# Patient Record
Sex: FEMALE | Race: BLACK OR AFRICAN AMERICAN | NOT HISPANIC OR LATINO | Employment: FULL TIME | URBAN - METROPOLITAN AREA
[De-identification: names, ages, dates, MRNs, and addresses within clinical notes are randomized per-mention and may not be internally consistent; named-entity substitution may affect disease eponyms.]

---

## 2023-03-23 RX ORDER — BLOOD-GLUCOSE,RECEIVER,CONT
1 EACH MISCELLANEOUS
COMMUNITY
End: 2024-04-16

## 2023-03-23 RX ORDER — INSULIN ASPART 100 [IU]/ML
5 INJECTION, SOLUTION INTRAVENOUS; SUBCUTANEOUS
COMMUNITY
Start: 2021-06-02 | End: 2024-04-16

## 2023-03-23 RX ORDER — BLOOD-GLUCOSE SENSOR
EACH MISCELLANEOUS
COMMUNITY
End: 2024-04-16

## 2023-03-23 RX ORDER — ALBUTEROL SULFATE 90 UG/1
1-2 AEROSOL, METERED RESPIRATORY (INHALATION)
COMMUNITY
Start: 2020-04-09 | End: 2024-04-16

## 2023-03-23 RX ORDER — LOSARTAN POTASSIUM 25 MG/1
50 TABLET ORAL DAILY
COMMUNITY
Start: 2018-08-31

## 2023-03-23 RX ORDER — ALPRAZOLAM 1 MG/1
1 TABLET ORAL EVERY 6 HOURS PRN
COMMUNITY
Start: 2021-04-07 | End: 2024-04-16

## 2023-03-23 RX ORDER — INSULIN GLARGINE 100 [IU]/ML
32 INJECTION, SOLUTION SUBCUTANEOUS DAILY
COMMUNITY
Start: 2019-06-27 | End: 2024-04-16

## 2023-03-23 RX ORDER — CHOLECALCIFEROL (VITAMIN D3) 25 MCG
1 TABLET ORAL DAILY
COMMUNITY
Start: 2021-09-03 | End: 2024-04-16

## 2023-03-23 RX ORDER — CLINDAMYCIN PHOSPHATE 10 UG/ML
LOTION TOPICAL 2 TIMES DAILY
COMMUNITY

## 2023-03-23 RX ORDER — NITROGLYCERIN 0.4 MG/1
0.4 TABLET SUBLINGUAL EVERY 5 MIN PRN
COMMUNITY
Start: 2020-04-06 | End: 2024-04-16

## 2023-03-23 RX ORDER — BLOOD SUGAR DIAGNOSTIC
STRIP MISCELLANEOUS
COMMUNITY
Start: 2018-06-25 | End: 2024-04-16

## 2023-03-23 RX ORDER — DEXTROSE 4 G
TABLET,CHEWABLE ORAL
COMMUNITY
Start: 2019-12-13 | End: 2024-04-16

## 2023-03-23 RX ORDER — CITALOPRAM 20 MG/1
20 TABLET, FILM COATED ORAL DAILY
COMMUNITY
End: 2024-04-16

## 2023-03-28 ENCOUNTER — APPOINTMENT (OUTPATIENT)
Dept: PRIMARY CARE | Facility: CLINIC | Age: 63
End: 2023-03-28
Payer: COMMERCIAL

## 2023-05-23 ENCOUNTER — APPOINTMENT (OUTPATIENT)
Dept: PRIMARY CARE | Facility: CLINIC | Age: 63
End: 2023-05-23
Payer: COMMERCIAL

## 2023-08-13 LAB — URINE CULTURE: ABNORMAL

## 2023-09-18 PROBLEM — J45.909 ASTHMA (HHS-HCC): Status: ACTIVE | Noted: 2023-09-18

## 2023-09-18 PROBLEM — M75.51 SUBACROMIAL BURSITIS OF RIGHT SHOULDER JOINT: Status: ACTIVE | Noted: 2023-09-18

## 2023-09-18 PROBLEM — R09.89 ABDOMINAL BRUIT: Status: ACTIVE | Noted: 2023-09-18

## 2023-09-18 PROBLEM — E78.00 HYPERCHOLESTEREMIA: Status: ACTIVE | Noted: 2023-09-18

## 2023-09-18 PROBLEM — M19.90 OSTEOARTHRITIS: Status: ACTIVE | Noted: 2023-09-18

## 2023-09-18 PROBLEM — K76.0 NAFLD (NONALCOHOLIC FATTY LIVER DISEASE): Status: ACTIVE | Noted: 2023-09-18

## 2023-09-18 PROBLEM — L29.9 PRURITUS: Status: ACTIVE | Noted: 2023-09-18

## 2023-09-18 PROBLEM — H02.889 MGD (MEIBOMIAN GLAND DYSFUNCTION): Status: ACTIVE | Noted: 2023-09-18

## 2023-09-18 PROBLEM — M47.816 ARTHRITIS OF LUMBAR SPINE: Status: ACTIVE | Noted: 2023-09-18

## 2023-09-18 PROBLEM — R30.0 DYSURIA: Status: ACTIVE | Noted: 2023-09-18

## 2023-09-18 PROBLEM — R06.83 SNORING: Status: ACTIVE | Noted: 2023-09-18

## 2023-09-18 PROBLEM — E11.9 DIABETES MELLITUS TYPE 2 WITHOUT RETINOPATHY (MULTI): Status: ACTIVE | Noted: 2023-09-18

## 2023-09-18 PROBLEM — H93.13 TINNITUS OF BOTH EARS: Status: ACTIVE | Noted: 2023-09-18

## 2023-09-18 PROBLEM — H53.8 BLURRY VISION: Status: ACTIVE | Noted: 2023-09-18

## 2023-09-18 PROBLEM — D25.9 FIBROID UTERUS: Status: ACTIVE | Noted: 2023-09-18

## 2023-09-18 PROBLEM — M46.1 SACROILIITIS (CMS-HCC): Status: ACTIVE | Noted: 2023-09-18

## 2023-09-18 PROBLEM — M99.07 SOMATIC DYSFUNCTION OF UPPER EXTREMITY: Status: ACTIVE | Noted: 2023-09-18

## 2023-09-18 PROBLEM — D22.9 ATYPICAL MOLE: Status: ACTIVE | Noted: 2023-09-18

## 2023-09-18 PROBLEM — N92.6 IRREGULAR MENSES: Status: ACTIVE | Noted: 2023-09-18

## 2023-09-18 PROBLEM — R51.9 FRONTAL HEADACHE: Status: ACTIVE | Noted: 2023-09-18

## 2023-09-18 PROBLEM — B00.9 HSV (HERPES SIMPLEX VIRUS) INFECTION: Status: ACTIVE | Noted: 2023-09-18

## 2023-09-18 PROBLEM — F41.9 ANXIETY: Status: ACTIVE | Noted: 2023-09-18

## 2023-09-18 PROBLEM — N20.0 CALCIUM KIDNEY STONES: Status: ACTIVE | Noted: 2023-09-18

## 2023-09-18 PROBLEM — E66.811 OBESITY (BMI 30.0-34.9): Status: ACTIVE | Noted: 2023-09-18

## 2023-09-18 PROBLEM — N95.1 MENOPAUSAL SYMPTOM: Status: ACTIVE | Noted: 2023-09-18

## 2023-09-18 PROBLEM — F17.210 NICOTINE DEPENDENCE, CIGARETTES, UNCOMPLICATED: Status: ACTIVE | Noted: 2023-09-18

## 2023-09-18 PROBLEM — J06.9 UPPER RESPIRATORY INFECTION: Status: ACTIVE | Noted: 2023-09-18

## 2023-09-18 PROBLEM — R30.9 PAINFUL URINATION: Status: ACTIVE | Noted: 2023-09-18

## 2023-09-18 PROBLEM — E11.9 DIABETES MELLITUS (MULTI): Status: ACTIVE | Noted: 2023-09-18

## 2023-09-18 PROBLEM — M19.011 ARTHRITIS OF RIGHT SHOULDER REGION: Status: ACTIVE | Noted: 2023-09-18

## 2023-09-18 PROBLEM — M75.01 BURSITIS OF SHOULDER, RIGHT, ADHESIVE: Status: ACTIVE | Noted: 2023-09-18

## 2023-09-18 PROBLEM — E55.9 VITAMIN D DEFICIENCY: Status: ACTIVE | Noted: 2023-09-18

## 2023-09-18 PROBLEM — H93.A3 SUBJECTIVE PULSATILE TINNITUS OF BOTH EARS: Status: ACTIVE | Noted: 2023-09-18

## 2023-09-18 PROBLEM — R74.8 ELEVATED ALKALINE PHOSPHATASE LEVEL: Status: ACTIVE | Noted: 2023-09-18

## 2023-09-18 PROBLEM — R42 DIZZINESS: Status: ACTIVE | Noted: 2023-09-18

## 2023-09-18 PROBLEM — G44.009 HEADACHES, CLUSTER: Status: ACTIVE | Noted: 2023-09-18

## 2023-09-18 PROBLEM — R79.9 ABNORMAL BLOOD CHEMISTRY: Status: ACTIVE | Noted: 2023-09-18

## 2023-09-18 PROBLEM — R22.1 NECK MASS: Status: ACTIVE | Noted: 2023-09-18

## 2023-09-18 PROBLEM — R93.89 ABNORMAL ULTRASOUND OF CAROTID ARTERY: Status: ACTIVE | Noted: 2023-09-18

## 2023-09-18 PROBLEM — N89.8 VAGINAL IRRITATION: Status: ACTIVE | Noted: 2023-09-18

## 2023-09-18 PROBLEM — L60.8 PINCER NAIL DEFORMITY: Status: ACTIVE | Noted: 2023-09-18

## 2023-09-18 PROBLEM — I24.9 ACS (ACUTE CORONARY SYNDROME) (MULTI): Status: ACTIVE | Noted: 2023-09-18

## 2023-09-18 PROBLEM — F40.248 SITUATIONAL PHOBIA: Status: ACTIVE | Noted: 2023-09-18

## 2023-09-18 PROBLEM — S61.309A: Status: ACTIVE | Noted: 2023-09-18

## 2023-09-18 PROBLEM — M25.511 CHRONIC RIGHT SHOULDER PAIN: Status: ACTIVE | Noted: 2023-09-18

## 2023-09-18 PROBLEM — M48.07 FORAMINAL STENOSIS OF LUMBOSACRAL REGION: Status: ACTIVE | Noted: 2023-09-18

## 2023-09-18 PROBLEM — G43.909 MIGRAINE HEADACHE: Status: ACTIVE | Noted: 2023-09-18

## 2023-09-18 PROBLEM — M65.4 DE QUERVAIN'S TENOSYNOVITIS, RIGHT: Status: ACTIVE | Noted: 2023-09-18

## 2023-09-18 PROBLEM — M75.21 TENDONITIS OF UPPER BICEPS TENDON OF RIGHT SHOULDER: Status: ACTIVE | Noted: 2023-09-18

## 2023-09-18 PROBLEM — G47.00 INSOMNIA: Status: ACTIVE | Noted: 2023-09-18

## 2023-09-18 PROBLEM — R92.8 ABNORMAL MAMMOGRAM: Status: ACTIVE | Noted: 2023-09-18

## 2023-09-18 PROBLEM — M48.061 STENOSIS OF LATERAL RECESS OF LUMBAR SPINE: Status: ACTIVE | Noted: 2023-09-18

## 2023-09-18 PROBLEM — R06.00 DYSPNEA: Status: ACTIVE | Noted: 2023-09-18

## 2023-09-18 PROBLEM — K21.9 GERD WITHOUT ESOPHAGITIS: Status: ACTIVE | Noted: 2023-09-18

## 2023-09-18 PROBLEM — N90.7 VULVAR CYST: Status: ACTIVE | Noted: 2023-09-18

## 2023-09-18 PROBLEM — H90.3 SENSORINEURAL HEARING LOSS (SNHL) OF BOTH EARS: Status: ACTIVE | Noted: 2023-09-18

## 2023-09-18 PROBLEM — I10 HYPERTENSION: Status: ACTIVE | Noted: 2023-09-18

## 2023-09-18 PROBLEM — G89.29 CHRONIC RIGHT SHOULDER PAIN: Status: ACTIVE | Noted: 2023-09-18

## 2023-09-18 PROBLEM — E66.9 OBESITY (BMI 30.0-34.9): Status: ACTIVE | Noted: 2023-09-18

## 2023-09-18 PROBLEM — N76.4 VULVAR ABSCESS: Status: ACTIVE | Noted: 2023-09-18

## 2023-09-18 PROBLEM — E04.0 DIFFUSE GOITER: Status: ACTIVE | Noted: 2023-09-18

## 2023-09-18 PROBLEM — R79.89 LOW VITAMIN D LEVEL: Status: ACTIVE | Noted: 2023-09-18

## 2023-09-18 PROBLEM — G56.00 CARPAL TUNNEL SYNDROME: Status: ACTIVE | Noted: 2023-09-18

## 2023-09-18 PROBLEM — N28.1 RENAL CYST: Status: ACTIVE | Noted: 2023-09-18

## 2023-09-18 PROBLEM — H04.123 BILATERAL DRY EYES: Status: ACTIVE | Noted: 2023-09-18

## 2023-09-18 PROBLEM — E04.9 GOITER: Status: ACTIVE | Noted: 2023-09-18

## 2023-09-18 PROBLEM — N95.1 VAGINAL DRYNESS, MENOPAUSAL: Status: ACTIVE | Noted: 2023-09-18

## 2023-09-18 PROBLEM — K80.20 GALLSTONES: Status: ACTIVE | Noted: 2023-09-18

## 2023-09-18 PROBLEM — M19.90 ARTHRITIS: Status: ACTIVE | Noted: 2023-09-18

## 2023-09-18 PROBLEM — E04.1 THYROID CYST: Status: ACTIVE | Noted: 2023-09-18

## 2023-09-18 PROBLEM — M54.16 LUMBAR RADICULOPATHY, RIGHT: Status: ACTIVE | Noted: 2023-09-18

## 2023-09-18 PROBLEM — T78.40XA ALLERGIC REACTION: Status: ACTIVE | Noted: 2023-09-18

## 2023-09-18 PROBLEM — J44.9 CHRONIC OBSTRUCTIVE PULMONARY DISEASE (MULTI): Status: ACTIVE | Noted: 2023-09-18

## 2023-09-18 PROBLEM — H25.13 AGE-RELATED NUCLEAR CATARACT OF BOTH EYES: Status: ACTIVE | Noted: 2023-09-18

## 2023-09-18 PROBLEM — L91.8 MULTIPLE ACQUIRED SKIN TAGS: Status: ACTIVE | Noted: 2023-09-18

## 2023-09-18 PROBLEM — R32 URINARY INCONTINENCE: Status: ACTIVE | Noted: 2023-09-18

## 2023-09-18 PROBLEM — M54.12 CERVICAL RADICULOPATHY: Status: ACTIVE | Noted: 2023-09-18

## 2023-09-18 PROBLEM — R09.89 BRUIT OF LEFT CAROTID ARTERY: Status: ACTIVE | Noted: 2023-09-18

## 2023-09-18 RX ORDER — PAROXETINE 10 MG/1
10 TABLET, FILM COATED ORAL EVERY MORNING
COMMUNITY

## 2023-09-18 RX ORDER — LAMOTRIGINE 100 MG/1
100 TABLET ORAL 2 TIMES DAILY
COMMUNITY
End: 2024-04-16

## 2023-09-18 RX ORDER — OMEPRAZOLE 40 MG/1
1 CAPSULE, DELAYED RELEASE ORAL EVERY 24 HOURS
COMMUNITY
End: 2024-04-16

## 2023-09-18 RX ORDER — BUPROPION HYDROCHLORIDE 300 MG/1
300 TABLET ORAL EVERY 24 HOURS
COMMUNITY
End: 2024-01-30 | Stop reason: ALTCHOICE

## 2023-09-18 RX ORDER — DEXLANSOPRAZOLE 60 MG/1
1 CAPSULE, DELAYED RELEASE ORAL EVERY 24 HOURS
COMMUNITY
End: 2024-04-16

## 2023-09-18 RX ORDER — SEMAGLUTIDE 1.34 MG/ML
1 INJECTION, SOLUTION SUBCUTANEOUS
COMMUNITY
Start: 2023-01-20 | End: 2024-04-16

## 2023-09-18 RX ORDER — SUCRALFATE 1 G/1
1 TABLET ORAL 2 TIMES DAILY
COMMUNITY
End: 2024-04-16

## 2023-09-18 RX ORDER — SEMAGLUTIDE 0.68 MG/ML
0.5 INJECTION, SOLUTION SUBCUTANEOUS
COMMUNITY
Start: 2021-07-30

## 2023-09-18 RX ORDER — CYCLOBENZAPRINE HCL 10 MG
10 TABLET ORAL 3 TIMES DAILY
COMMUNITY
Start: 2020-01-16 | End: 2024-04-16

## 2024-01-02 ENCOUNTER — HOSPITAL ENCOUNTER (OUTPATIENT)
Dept: RADIOLOGY | Facility: CLINIC | Age: 64
Discharge: HOME | End: 2024-01-02
Payer: MEDICAID

## 2024-01-02 ENCOUNTER — LAB (OUTPATIENT)
Dept: LAB | Facility: LAB | Age: 64
End: 2024-01-02
Payer: MEDICAID

## 2024-01-02 DIAGNOSIS — I11.9 HYPERTENSIVE HEART DISEASE WITHOUT HEART FAILURE: ICD-10-CM

## 2024-01-02 DIAGNOSIS — Z12.31 SCREENING MAMMOGRAM FOR BREAST CANCER: ICD-10-CM

## 2024-01-02 DIAGNOSIS — E11.9 TYPE 2 DIABETES MELLITUS WITHOUT COMPLICATIONS (MULTI): Primary | ICD-10-CM

## 2024-01-02 LAB
25(OH)D3 SERPL-MCNC: 30 NG/ML (ref 30–100)
ALBUMIN SERPL BCP-MCNC: 4.3 G/DL (ref 3.4–5)
ALP SERPL-CCNC: 111 U/L (ref 33–136)
ALT SERPL W P-5'-P-CCNC: 23 U/L (ref 7–45)
ANION GAP SERPL CALC-SCNC: 15 MMOL/L (ref 10–20)
AST SERPL W P-5'-P-CCNC: 15 U/L (ref 9–39)
BILIRUB SERPL-MCNC: 0.6 MG/DL (ref 0–1.2)
BUN SERPL-MCNC: 14 MG/DL (ref 6–23)
CALCIUM SERPL-MCNC: 10.2 MG/DL (ref 8.6–10.6)
CHLORIDE SERPL-SCNC: 105 MMOL/L (ref 98–107)
CHOLEST SERPL-MCNC: 222 MG/DL (ref 0–199)
CHOLESTEROL/HDL RATIO: 4.5
CO2 SERPL-SCNC: 24 MMOL/L (ref 21–32)
CREAT SERPL-MCNC: 0.88 MG/DL (ref 0.5–1.05)
ERYTHROCYTE [DISTWIDTH] IN BLOOD BY AUTOMATED COUNT: 14.6 % (ref 11.5–14.5)
EST. AVERAGE GLUCOSE BLD GHB EST-MCNC: 235 MG/DL
GFR SERPL CREATININE-BSD FRML MDRD: 73 ML/MIN/1.73M*2
GLUCOSE SERPL-MCNC: 234 MG/DL (ref 74–99)
HBA1C MFR BLD: 9.8 %
HCT VFR BLD AUTO: 44.5 % (ref 36–46)
HDLC SERPL-MCNC: 49 MG/DL
HGB BLD-MCNC: 14.1 G/DL (ref 12–16)
LDLC SERPL CALC-MCNC: 143 MG/DL
MCH RBC QN AUTO: 26.8 PG (ref 26–34)
MCHC RBC AUTO-ENTMCNC: 31.7 G/DL (ref 32–36)
MCV RBC AUTO: 85 FL (ref 80–100)
NON HDL CHOLESTEROL: 173 MG/DL (ref 0–149)
NRBC BLD-RTO: 0 /100 WBCS (ref 0–0)
PLATELET # BLD AUTO: 273 X10*3/UL (ref 150–450)
POTASSIUM SERPL-SCNC: 4.4 MMOL/L (ref 3.5–5.3)
PROT SERPL-MCNC: 6.7 G/DL (ref 6.4–8.2)
RBC # BLD AUTO: 5.26 X10*6/UL (ref 4–5.2)
SODIUM SERPL-SCNC: 140 MMOL/L (ref 136–145)
TRIGL SERPL-MCNC: 152 MG/DL (ref 0–149)
TSH SERPL-ACNC: 2.5 MIU/L (ref 0.44–3.98)
VLDL: 30 MG/DL (ref 0–40)
WBC # BLD AUTO: 9.9 X10*3/UL (ref 4.4–11.3)

## 2024-01-02 PROCEDURE — 77067 SCR MAMMO BI INCL CAD: CPT

## 2024-01-02 PROCEDURE — 80053 COMPREHEN METABOLIC PANEL: CPT

## 2024-01-02 PROCEDURE — 85027 COMPLETE CBC AUTOMATED: CPT

## 2024-01-02 PROCEDURE — 77067 SCR MAMMO BI INCL CAD: CPT | Performed by: RADIOLOGY

## 2024-01-02 PROCEDURE — 84443 ASSAY THYROID STIM HORMONE: CPT

## 2024-01-02 PROCEDURE — 80061 LIPID PANEL: CPT

## 2024-01-02 PROCEDURE — 36415 COLL VENOUS BLD VENIPUNCTURE: CPT

## 2024-01-02 PROCEDURE — 83036 HEMOGLOBIN GLYCOSYLATED A1C: CPT

## 2024-01-02 PROCEDURE — 82306 VITAMIN D 25 HYDROXY: CPT

## 2024-01-02 PROCEDURE — 77063 BREAST TOMOSYNTHESIS BI: CPT | Performed by: RADIOLOGY

## 2024-01-30 ENCOUNTER — OFFICE VISIT (OUTPATIENT)
Dept: OBSTETRICS AND GYNECOLOGY | Facility: CLINIC | Age: 64
End: 2024-01-30
Payer: COMMERCIAL

## 2024-01-30 VITALS
BODY MASS INDEX: 33.61 KG/M2 | WEIGHT: 178 LBS | HEIGHT: 61 IN | SYSTOLIC BLOOD PRESSURE: 130 MMHG | DIASTOLIC BLOOD PRESSURE: 70 MMHG

## 2024-01-30 DIAGNOSIS — N76.4 VULVAR ABSCESS: Primary | ICD-10-CM

## 2024-01-30 PROCEDURE — 3046F HEMOGLOBIN A1C LEVEL >9.0%: CPT | Performed by: OBSTETRICS & GYNECOLOGY

## 2024-01-30 PROCEDURE — 3050F LDL-C >= 130 MG/DL: CPT | Performed by: OBSTETRICS & GYNECOLOGY

## 2024-01-30 PROCEDURE — 3078F DIAST BP <80 MM HG: CPT | Performed by: OBSTETRICS & GYNECOLOGY

## 2024-01-30 PROCEDURE — 87205 SMEAR GRAM STAIN: CPT

## 2024-01-30 PROCEDURE — 87075 CULTR BACTERIA EXCEPT BLOOD: CPT

## 2024-01-30 PROCEDURE — 99214 OFFICE O/P EST MOD 30 MIN: CPT | Performed by: OBSTETRICS & GYNECOLOGY

## 2024-01-30 PROCEDURE — 4010F ACE/ARB THERAPY RXD/TAKEN: CPT | Performed by: OBSTETRICS & GYNECOLOGY

## 2024-01-30 PROCEDURE — 3075F SYST BP GE 130 - 139MM HG: CPT | Performed by: OBSTETRICS & GYNECOLOGY

## 2024-01-30 PROCEDURE — 87070 CULTURE OTHR SPECIMN AEROBIC: CPT

## 2024-01-30 ASSESSMENT — PAIN SCALES - GENERAL: PAINLEVEL: 0-NO PAIN

## 2024-01-30 NOTE — PROGRESS NOTES
63-year-old obese -0-2-1 postmenopausal -American woman presents with recurrent vulvar folliculitis/abscesses.  She has been treated in the past with antibiotics.    She completed a 7-day course of Augmentin yesterday.  She states that the abscess has almost completely resolved and is not as tender.    She reports difficulty with glycemic control-her fasting glucose was 170 today.    O: WDWN woman in NAD   Mons - slightly left of center area or resolving induration w/o evidence of cellulitis, three 1mm openings/drainage sites    A/P Resolving mons pubis folliculitis     -  Vulvar care d/w the patient     -  Vulvovaginal skin care printed info given    -  Encouraged cessation of regular shaving     -  Skin cx sent    -  Derm referral     -  PCP F/U     -  RTC for F/U and possible EUA with excision

## 2024-01-30 NOTE — PATIENT INSTRUCTIONS
Thanks for coming in today for follow-up.    Culture was sent today.  Results should be available in the next 24 to 72 hours.  You may call the office and select option #2 to speak with the nurse to obtain the results.    Review the information of vulvovaginal care.    Follow-up with dermatology about your recurrent vulvar abscess.    Return to the office in the next few weeks to see Dr. Stewart to discuss possible surgical excision of your recurrent vulvar abscess.    Follow-up with your PCP and other healthcare specialist as needed.    Feel free to call the office with any problems, questions or concerns prior to your next scheduled visit.

## 2024-02-02 ENCOUNTER — TELEPHONE (OUTPATIENT)
Dept: ENDOCRINOLOGY | Facility: HOSPITAL | Age: 64
End: 2024-02-02
Payer: COMMERCIAL

## 2024-02-02 NOTE — TELEPHONE ENCOUNTER
Spoke with patient and informed them of denial for refill request. Informed patient that provider is no longer with practice and they were encouraged to schedule with a new provider. Patient stated she has a new provider and declined to reschedule at this time.

## 2024-02-03 LAB
BACTERIA SPEC CULT: NORMAL
GRAM STN SPEC: NORMAL
GRAM STN SPEC: NORMAL

## 2024-02-15 ENCOUNTER — APPOINTMENT (OUTPATIENT)
Dept: GASTROENTEROLOGY | Facility: HOSPITAL | Age: 64
End: 2024-02-15
Payer: COMMERCIAL

## 2024-04-01 ENCOUNTER — APPOINTMENT (OUTPATIENT)
Dept: GASTROENTEROLOGY | Facility: CLINIC | Age: 64
End: 2024-04-01
Payer: COMMERCIAL

## 2024-04-11 ENCOUNTER — APPOINTMENT (OUTPATIENT)
Dept: DERMATOLOGY | Facility: CLINIC | Age: 64
End: 2024-04-11
Payer: COMMERCIAL

## 2024-04-16 ENCOUNTER — OFFICE VISIT (OUTPATIENT)
Dept: OBSTETRICS AND GYNECOLOGY | Facility: CLINIC | Age: 64
End: 2024-04-16
Payer: COMMERCIAL

## 2024-04-16 VITALS
SYSTOLIC BLOOD PRESSURE: 160 MMHG | BODY MASS INDEX: 33.42 KG/M2 | DIASTOLIC BLOOD PRESSURE: 76 MMHG | HEIGHT: 61 IN | WEIGHT: 177 LBS

## 2024-04-16 DIAGNOSIS — L72.3 SEBACEOUS CYST: Primary | ICD-10-CM

## 2024-04-16 DIAGNOSIS — B00.9 HERPES: ICD-10-CM

## 2024-04-16 PROCEDURE — 3077F SYST BP >= 140 MM HG: CPT | Performed by: ADVANCED PRACTICE MIDWIFE

## 2024-04-16 PROCEDURE — 99213 OFFICE O/P EST LOW 20 MIN: CPT | Performed by: ADVANCED PRACTICE MIDWIFE

## 2024-04-16 PROCEDURE — 3046F HEMOGLOBIN A1C LEVEL >9.0%: CPT | Performed by: ADVANCED PRACTICE MIDWIFE

## 2024-04-16 PROCEDURE — 3050F LDL-C >= 130 MG/DL: CPT | Performed by: ADVANCED PRACTICE MIDWIFE

## 2024-04-16 PROCEDURE — 4010F ACE/ARB THERAPY RXD/TAKEN: CPT | Performed by: ADVANCED PRACTICE MIDWIFE

## 2024-04-16 PROCEDURE — 3078F DIAST BP <80 MM HG: CPT | Performed by: ADVANCED PRACTICE MIDWIFE

## 2024-04-16 RX ORDER — VALACYCLOVIR HYDROCHLORIDE 500 MG/1
500 TABLET, FILM COATED ORAL 2 TIMES DAILY
Qty: 60 TABLET | Refills: 2 | Status: SHIPPED | OUTPATIENT
Start: 2024-04-16 | End: 2024-05-16

## 2024-04-16 RX ORDER — VALACYCLOVIR HYDROCHLORIDE 500 MG/1
500 TABLET, FILM COATED ORAL 2 TIMES DAILY
COMMUNITY

## 2024-04-16 RX ORDER — INSULIN GLARGINE-YFGN 100 [IU]/ML
INJECTION, SOLUTION SUBCUTANEOUS EVERY 24 HOURS
COMMUNITY

## 2024-04-16 ASSESSMENT — PAIN SCALES - GENERAL: PAINLEVEL: 0-NO PAIN

## 2024-04-16 NOTE — PROGRESS NOTES
Subjective   Patient ID: Annie Mac is a 63 y.o. female who presents for Follow-up (Discuss HSV/Mary Camejo CMA).  HPI  Annie presents today for cyst in [facility] area.  She has had it for 2 years.  Had it lanced with not much fluid came out from derm 2 months ago    Review of Systems  NEG    Objective   Physical Exam  A&O x 3  Skin:  intact  Respirations:  unlabored and even  EGBUS:  small 1cm sebaceous cyst in [facility]    Assessment/Plan   Problem List Items Addressed This Visit    None  Visit Diagnoses         Codes    Sebaceous cyst    -  Primary L72.3    Herpes     B00.9    Relevant Medications    valACYclovir (Valtrex) 500 mg tablet                 CHACHO Plummer 04/16/24 9:35 AM

## 2024-04-16 NOTE — PROGRESS NOTES
"PCP: Christopher D'Amico, DO   Referred:     Annie Mac is a 63 y.o. female with PMH of obesity, HTN, DMT2, COPD, recurrent vulvar folliculitis/abscesses, and VERONIKA, presenting with GERD.    History Of Present Illness:  She was on ranitidine in the past (it did work well), all the -prazoles have not worked for her (she has been on them at least for 2 months, at most for a year), and famotidine did not working either. She takes Tums, which help, but only for 1-2 hours.    She is having epigastric/chest pain and heartburn. For years. Not food related.    - n/v, constipation, diarrhea, melena, hematochezia, weight loss    12-point ROS was reviewed and is otherwise negative.    Pertinent Procedures:  2016 EGD:  Impression:     - Normal endoscopy                         - No evidence of esophagitis, gastritis, or ulcers                         - No specimens collected.    2016 colonoscopy:  Impression:     - Normal colonoscopy. No polyps seen                         - No specimens collected.    Family History:  Sister - constipation, \"bowel problems\"    Social History:  -Occupation: she is an administrative worker at the [employer]  -EtOH: denied  -Recreational drugs: denied  -Tobacco: 2-3 cigarettes/day for 40 years    Past Medical History:  She has a past medical history of Bursitis of right shoulder (02/24/2022), Cough, unspecified (05/07/2015), Cyst of kidney, acquired (09/12/2018), Other specified noninflammatory disorders of vagina (08/01/2022), Personal history of other diseases of the respiratory system, Personal history of pneumonia (recurrent), and Type 2 diabetes mellitus without complications (Multi) (12/29/2022).    Home Medications:  Current Outpatient Medications   Medication Instructions    clindamycin (Cleocin T) 1 % lotion Topical, 2 times daily, Apply sparingly and massage topically    insulin glargine-yfgn (Semglee,insulin glargine-yfgn,) 100 unit/mL vial subcutaneous, Every 24 hours, Take " "as directed per insulin instructions.    losartan (COZAAR) 50 mg, oral, Daily    Ozempic 0.5 mg, subcutaneous, Once Weekly    PARoxetine (PAXIL) 10 mg, oral, Every morning    pen needle, diabetic (BD ULTRA-FINE JAZ PEN NEEDLE MISC) miscellaneous, 32G X 4MM; uses 4 times a day    valACYclovir (VALTREX) 500 mg, oral, 2 times daily    valACYclovir (VALTREX) 500 mg, oral, 2 times daily        Surgical History:  She has a past surgical history that includes Breast surgery (2013); Other surgical history (2020); Hysterectomy (2016); Other surgical history (2014);  section, classic (2014); and US guided thyroid biopsy (10/3/2016).     Social History:  She reports that she has been smoking cigarettes. She has never used smokeless tobacco. She reports that she does not drink alcohol and does not use drugs.    Family History:  Family History   Problem Relation Name Age of Onset    Hypertension Mother      Diabetes type II Mother      Hypertension Father      Diabetes type II Father      Coronary artery disease Brother      Breast cancer Maternal Grandmother      Breast cancer Maternal Cousin          Allergies:  Iodinated contrast media, Aspirin, Codeine, Exenatide, Humalog zach kwikpen u-100 [insulin lispro], Metronidazole, and Sulfamethoxazole-trimethoprim    OBJECTIVES:  Vital Signs:  Blood pressure 117/75, pulse 79, temperature 36.1 °C (96.9 °F), height 1.549 m (5' 1\"), weight 80.3 kg (177 lb), SpO2 96%.    Physical Exam:   General: Patient is in NAD.  Neuro: A and O x 3, CN 1-12 grossly intact.  HEENT: PERRLA.  CV: RRR, no m/r/g.  Pulm: CTA BL, no crackles, or wheezes.  Abd: Soft, NTTP, no rebound or guarding.  Psych: Appropriate mood and behavior.    Labs:  2024 A1c 9.8%    Assessment/Plan     Annie Mac is a 63 y.o. female with PMH of obesity, HTN, DMT2, COPD, recurrent vulvar folliculitis/abscesses, and VERONIKA, presenting with GERD.    I am not sure if she has GERD. EGD " in 2016 was normal. She has never had pH testing. She has never responded to PPI.    We will acquire an EGD w/ BRAVO and treat appropriately depending on the results.  Until then she can trial Gaviscon.    I discussed with her the importance of smoking cessation for her GI tract and overall health. She says that she is working on that. She has acquired a nicotine patch.    RTC in 3-5 months.    Antithrombotics: none  Infection: none  Additional Barriers to endoscopy: none    Danita Bobo MD

## 2024-04-18 ENCOUNTER — OFFICE VISIT (OUTPATIENT)
Dept: GASTROENTEROLOGY | Facility: HOSPITAL | Age: 64
End: 2024-04-18
Payer: COMMERCIAL

## 2024-04-18 VITALS
OXYGEN SATURATION: 96 % | HEIGHT: 61 IN | BODY MASS INDEX: 33.42 KG/M2 | DIASTOLIC BLOOD PRESSURE: 75 MMHG | TEMPERATURE: 96.9 F | SYSTOLIC BLOOD PRESSURE: 117 MMHG | WEIGHT: 177 LBS | HEART RATE: 79 BPM

## 2024-04-18 DIAGNOSIS — R12 HEARTBURN: Primary | ICD-10-CM

## 2024-04-18 PROCEDURE — 4010F ACE/ARB THERAPY RXD/TAKEN: CPT | Performed by: STUDENT IN AN ORGANIZED HEALTH CARE EDUCATION/TRAINING PROGRAM

## 2024-04-18 PROCEDURE — 3074F SYST BP LT 130 MM HG: CPT | Performed by: STUDENT IN AN ORGANIZED HEALTH CARE EDUCATION/TRAINING PROGRAM

## 2024-04-18 PROCEDURE — 3046F HEMOGLOBIN A1C LEVEL >9.0%: CPT | Performed by: STUDENT IN AN ORGANIZED HEALTH CARE EDUCATION/TRAINING PROGRAM

## 2024-04-18 PROCEDURE — 99204 OFFICE O/P NEW MOD 45 MIN: CPT | Performed by: STUDENT IN AN ORGANIZED HEALTH CARE EDUCATION/TRAINING PROGRAM

## 2024-04-18 PROCEDURE — 3078F DIAST BP <80 MM HG: CPT | Performed by: STUDENT IN AN ORGANIZED HEALTH CARE EDUCATION/TRAINING PROGRAM

## 2024-04-18 PROCEDURE — 3050F LDL-C >= 130 MG/DL: CPT | Performed by: STUDENT IN AN ORGANIZED HEALTH CARE EDUCATION/TRAINING PROGRAM

## 2024-04-18 PROCEDURE — 99214 OFFICE O/P EST MOD 30 MIN: CPT | Mod: GC | Performed by: STUDENT IN AN ORGANIZED HEALTH CARE EDUCATION/TRAINING PROGRAM

## 2024-04-18 RX ORDER — MAG/ALUMINUM/SOD BICARB/ALGINC 14.2-80MG
2 TABLET,CHEWABLE ORAL 4 TIMES DAILY
Qty: 240 TABLET | Refills: 3 | Status: SHIPPED | OUTPATIENT
Start: 2024-04-18 | End: 2024-05-18

## 2024-04-18 ASSESSMENT — PAIN SCALES - GENERAL: PAINLEVEL: 0-NO PAIN

## 2024-04-18 NOTE — PATIENT INSTRUCTIONS
Dear Ms. Kj Mac,    It was a pleasure meeting you in clinic today.    Please make sure you get the endoscopy done. Once the results are available we will let you know.    Keep working on smoking cessation.    Please return to clinic in 3-5 months.    Best regards,    Danita Bobo MD  Division of Gastroenterology and Liver Disease  [address] [place] [address] [address] [place] [id number]  Appt. Phone   Inquiries   Fax  or

## 2024-04-18 NOTE — PROGRESS NOTES
I saw and evaluated the patient. I personally obtained the key and critical portions of the history and physical exam or was physically present for key and critical portions performed by the resident/fellow. I reviewed the resident/fellow's documentation and discussed the patient with the resident/fellow. I agree with the resident/fellow's medical decision making as documented in the note.    Andreas Pereyra MD

## 2024-05-09 ENCOUNTER — APPOINTMENT (OUTPATIENT)
Dept: NEUROLOGY | Facility: HOSPITAL | Age: 64
End: 2024-05-09
Payer: COMMERCIAL

## 2024-05-14 ENCOUNTER — APPOINTMENT (OUTPATIENT)
Dept: PRIMARY CARE | Facility: CLINIC | Age: 64
End: 2024-05-14
Payer: COMMERCIAL

## 2024-05-16 ENCOUNTER — APPOINTMENT (OUTPATIENT)
Dept: NEUROLOGY | Facility: HOSPITAL | Age: 64
End: 2024-05-16
Payer: COMMERCIAL

## 2024-06-25 ENCOUNTER — APPOINTMENT (OUTPATIENT)
Dept: GASTROENTEROLOGY | Facility: HOSPITAL | Age: 64
End: 2024-06-25
Payer: COMMERCIAL

## 2024-06-27 ENCOUNTER — ANESTHESIA (OUTPATIENT)
Dept: GASTROENTEROLOGY | Facility: HOSPITAL | Age: 64
End: 2024-06-27
Payer: COMMERCIAL

## 2024-06-27 ENCOUNTER — ANESTHESIA EVENT (OUTPATIENT)
Dept: GASTROENTEROLOGY | Facility: HOSPITAL | Age: 64
End: 2024-06-27
Payer: COMMERCIAL

## 2024-06-27 ENCOUNTER — HOSPITAL ENCOUNTER (OUTPATIENT)
Dept: GASTROENTEROLOGY | Facility: HOSPITAL | Age: 64
Discharge: HOME | End: 2024-06-27
Payer: COMMERCIAL

## 2024-06-27 VITALS
BODY MASS INDEX: 32.85 KG/M2 | WEIGHT: 174 LBS | SYSTOLIC BLOOD PRESSURE: 128 MMHG | RESPIRATION RATE: 16 BRPM | HEART RATE: 70 BPM | HEIGHT: 61 IN | TEMPERATURE: 97 F | OXYGEN SATURATION: 100 % | DIASTOLIC BLOOD PRESSURE: 67 MMHG

## 2024-06-27 DIAGNOSIS — R12 HEARTBURN: ICD-10-CM

## 2024-06-27 LAB — GLUCOSE BLD MANUAL STRIP-MCNC: 189 MG/DL (ref 74–99)

## 2024-06-27 PROCEDURE — 3700000001 HC GENERAL ANESTHESIA TIME - INITIAL BASE CHARGE

## 2024-06-27 PROCEDURE — 2500000004 HC RX 250 GENERAL PHARMACY W/ HCPCS (ALT 636 FOR OP/ED): Mod: SE

## 2024-06-27 PROCEDURE — 7100000009 HC PHASE TWO TIME - INITIAL BASE CHARGE

## 2024-06-27 PROCEDURE — 91035 G-ESOPH REFLX TST W/ELECTROD: CPT | Performed by: ANESTHESIOLOGY

## 2024-06-27 PROCEDURE — 2720000007 HC OR 272 NO HCPCS

## 2024-06-27 PROCEDURE — 7100000010 HC PHASE TWO TIME - EACH INCREMENTAL 1 MINUTE

## 2024-06-27 PROCEDURE — 43235 EGD DIAGNOSTIC BRUSH WASH: CPT | Performed by: INTERNAL MEDICINE

## 2024-06-27 PROCEDURE — 2500000005 HC RX 250 GENERAL PHARMACY W/O HCPCS: Mod: SE

## 2024-06-27 PROCEDURE — 3700000002 HC GENERAL ANESTHESIA TIME - EACH INCREMENTAL 1 MINUTE

## 2024-06-27 PROCEDURE — 82947 ASSAY GLUCOSE BLOOD QUANT: CPT

## 2024-06-27 RX ORDER — PROPOFOL 10 MG/ML
INJECTION, EMULSION INTRAVENOUS AS NEEDED
Status: DISCONTINUED | OUTPATIENT
Start: 2024-06-27 | End: 2024-06-27

## 2024-06-27 RX ORDER — ONDANSETRON HYDROCHLORIDE 2 MG/ML
INJECTION, SOLUTION INTRAVENOUS AS NEEDED
Status: DISCONTINUED | OUTPATIENT
Start: 2024-06-27 | End: 2024-06-27

## 2024-06-27 RX ORDER — LIDOCAINE HCL/PF 100 MG/5ML
SYRINGE (ML) INTRAVENOUS AS NEEDED
Status: DISCONTINUED | OUTPATIENT
Start: 2024-06-27 | End: 2024-06-27

## 2024-06-27 RX ORDER — MIDAZOLAM HYDROCHLORIDE 1 MG/ML
INJECTION INTRAMUSCULAR; INTRAVENOUS AS NEEDED
Status: DISCONTINUED | OUTPATIENT
Start: 2024-06-27 | End: 2024-06-27

## 2024-06-27 SDOH — HEALTH STABILITY: MENTAL HEALTH: CURRENT SMOKER: 0

## 2024-06-27 ASSESSMENT — PAIN SCALES - GENERAL
PAINLEVEL_OUTOF10: 0 - NO PAIN
PAINLEVEL_OUTOF10: 0 - NO PAIN
PAIN_LEVEL: 4
PAINLEVEL_OUTOF10: 0 - NO PAIN

## 2024-06-27 ASSESSMENT — PAIN - FUNCTIONAL ASSESSMENT
PAIN_FUNCTIONAL_ASSESSMENT: 0-10

## 2024-06-27 ASSESSMENT — COLUMBIA-SUICIDE SEVERITY RATING SCALE - C-SSRS
2. HAVE YOU ACTUALLY HAD ANY THOUGHTS OF KILLING YOURSELF?: NO
1. IN THE PAST MONTH, HAVE YOU WISHED YOU WERE DEAD OR WISHED YOU COULD GO TO SLEEP AND NOT WAKE UP?: NO
6. HAVE YOU EVER DONE ANYTHING, STARTED TO DO ANYTHING, OR PREPARED TO DO ANYTHING TO END YOUR LIFE?: NO

## 2024-06-27 NOTE — ANESTHESIA PREPROCEDURE EVALUATION
Patient: Annie Mac    Procedure Information       Date/Time: 06/27/24 0930    Scheduled providers: Bobby Hernandez MD; Davion Argueta RN    Procedures:       BRAVO      EGD    Location: [facility]            Relevant Problems   Cardiac   (+) Hypercholesteremia   (+) Hypertension      Pulmonary   (+) Asthma (HHS-HCC)   (+) Chronic obstructive pulmonary disease (Multi)      Neuro   (+) Anxiety   (+) Carpal tunnel syndrome   (+) Cervical radiculopathy   (+) Lumbar radiculopathy, right   (+) Situational phobia      GI   (+) GERD without esophagitis      /Renal   (+) Calcium kidney stones      Liver   (+) Gallstones   (+) NAFLD (nonalcoholic fatty liver disease)      Endocrine   (+) Diabetes mellitus type 2 without retinopathy (Multi)   (+) Diffuse goiter   (+) Goiter      Musculoskeletal   (+) Carpal tunnel syndrome   (+) Foraminal stenosis of lumbosacral region   (+) Osteoarthritis   (+) Stenosis of lateral recess of lumbar spine      HEENT   (+) Sensorineural hearing loss (SNHL) of both ears      ID   (+) HSV (herpes simplex virus) infection   (+) Upper respiratory infection      GYN   (+) Fibroid uterus       Clinical information reviewed:   Tobacco  Allergies  Meds   Med Hx  Surg Hx  OB Status  Fam Hx  Soc   Hx        NPO Detail:  NPO/Void Status  Date of Last Liquid: 06/27/24  Time of Last Liquid: 0600  Date of Last Solid: 06/26/24  Time of Last Solid: 1900  Last Intake Type: Clear fluids         Physical Exam    Airway  Mallampati: I  TM distance: >3 FB  Neck ROM: full     Cardiovascular - normal exam     Dental - normal exam     Pulmonary - normal exam     Abdominal - normal exam         Anesthesia Plan    History of general anesthesia?: yes  History of complications of general anesthesia?: no    ASA 2     MAC     The patient is not a current smoker.  Patient was not previously instructed to abstain from smoking on day of procedure.  Patient did not smoke on day of  procedure.    intravenous induction   Postoperative administration of opioids is intended.  Anesthetic plan and risks discussed with patient.  Use of blood products discussed with patient who.    Plan discussed with CAA.

## 2024-06-27 NOTE — H&P
Subjective     History of Present Illness:   Annie Mac is a 63 y.o. female who presents to endoscopy    Physical Exam  General: not in acute distress  CV: regular rate and rhythm  Resp: non-labored breathing

## 2024-06-27 NOTE — ANESTHESIA POSTPROCEDURE EVALUATION
Patient: Annie Mac    Procedure Summary       Date: 06/27/24 Room / Location: [facility]    Anesthesia Start: 0955 Anesthesia Stop: 1018    Procedures:       BRAVO      EGD Diagnosis: Heartburn    Scheduled Providers: Bobby Hernandez MD; Davion Argueta RN Responsible Provider: Emery Martínez MD    Anesthesia Type: MAC ASA Status: 2            Anesthesia Type: MAC    Vitals Value Taken Time   /67 06/27/24 1051   Temp 36.1 °C (97 °F) 06/27/24 1015   Pulse 70 06/27/24 1051   Resp 16 06/27/24 1051   SpO2 100 % 06/27/24 1051       Anesthesia Post Evaluation    Patient location during evaluation: PACU  Patient participation: complete - patient participated  Level of consciousness: awake and alert  Pain score: 4  Pain management: adequate  Airway patency: patent  Cardiovascular status: acceptable  Respiratory status: acceptable  Hydration status: acceptable  Postoperative Nausea and Vomiting: none    No notable events documented.

## 2024-07-02 ENCOUNTER — APPOINTMENT (OUTPATIENT)
Dept: GASTROENTEROLOGY | Facility: HOSPITAL | Age: 64
End: 2024-07-02
Payer: COMMERCIAL

## 2024-07-09 ENCOUNTER — APPOINTMENT (OUTPATIENT)
Dept: GASTROENTEROLOGY | Facility: HOSPITAL | Age: 64
End: 2024-07-09
Payer: COMMERCIAL

## 2024-07-22 ENCOUNTER — TELEPHONE (OUTPATIENT)
Dept: GASTROENTEROLOGY | Facility: HOSPITAL | Age: 64
End: 2024-07-22
Payer: MEDICAID

## 2024-07-22 NOTE — TELEPHONE ENCOUNTER
Annie is on Ann's schedule for tomorrow 7/22 to review results of test. Ann prefers the patient follow up with you because you had ordered the tests.   Can the patient be called with the results?     Thank you  Goldie

## 2024-07-23 ENCOUNTER — APPOINTMENT (OUTPATIENT)
Dept: GASTROENTEROLOGY | Facility: HOSPITAL | Age: 64
End: 2024-07-23
Payer: COMMERCIAL

## 2024-07-29 ENCOUNTER — APPOINTMENT (OUTPATIENT)
Dept: DERMATOLOGY | Facility: HOSPITAL | Age: 64
End: 2024-07-29
Payer: COMMERCIAL

## 2024-08-05 ENCOUNTER — APPOINTMENT (OUTPATIENT)
Dept: NEUROLOGY | Facility: CLINIC | Age: 64
End: 2024-08-05
Payer: COMMERCIAL

## 2024-08-08 ENCOUNTER — APPOINTMENT (OUTPATIENT)
Dept: DERMATOLOGY | Facility: CLINIC | Age: 64
End: 2024-08-08
Payer: MEDICAID

## 2024-08-08 DIAGNOSIS — L73.2 HIDRADENITIS SUPPURATIVA: Primary | ICD-10-CM

## 2024-08-08 PROCEDURE — 4010F ACE/ARB THERAPY RXD/TAKEN: CPT | Performed by: STUDENT IN AN ORGANIZED HEALTH CARE EDUCATION/TRAINING PROGRAM

## 2024-08-08 PROCEDURE — 99214 OFFICE O/P EST MOD 30 MIN: CPT | Performed by: STUDENT IN AN ORGANIZED HEALTH CARE EDUCATION/TRAINING PROGRAM

## 2024-08-08 PROCEDURE — 3050F LDL-C >= 130 MG/DL: CPT | Performed by: STUDENT IN AN ORGANIZED HEALTH CARE EDUCATION/TRAINING PROGRAM

## 2024-08-08 PROCEDURE — 3046F HEMOGLOBIN A1C LEVEL >9.0%: CPT | Performed by: STUDENT IN AN ORGANIZED HEALTH CARE EDUCATION/TRAINING PROGRAM

## 2024-08-08 RX ORDER — CLINDAMYCIN PHOSPHATE 10 UG/ML
LOTION TOPICAL
Qty: 60 ML | Refills: 3 | Status: SHIPPED | OUTPATIENT
Start: 2024-08-08

## 2024-08-08 RX ORDER — BENZOYL PEROXIDE 50 MG/ML
LIQUID TOPICAL
Qty: 240 G | Refills: 3 | Status: SHIPPED | OUTPATIENT
Start: 2024-08-08

## 2024-08-08 ASSESSMENT — DERMATOLOGY QUALITY OF LIFE (QOL) ASSESSMENT
DATE THE QUALITY-OF-LIFE ASSESSMENT WAS COMPLETED: 67060
ARE THERE EXCLUSIONS OR EXCEPTIONS FOR THE QUALITY OF LIFE ASSESSMENT: NO
RATE HOW BOTHERED YOU ARE BY EFFECTS OF YOUR SKIN PROBLEMS ON YOUR ACTIVITIES (EG, GOING OUT, ACCOMPLISHING WHAT YOU WANT, WORK ACTIVITIES OR YOUR RELATIONSHIPS WITH OTHERS): 0 - NEVER BOTHERED
RATE HOW EMOTIONALLY BOTHERED YOU ARE BY YOUR SKIN PROBLEM (FOR EXAMPLE, WORRY, EMBARRASSMENT, FRUSTRATION): 0 - NEVER BOTHERED
WHAT SINGLE SKIN CONDITION LISTED BELOW IS THE PATIENT ANSWERING THE QUALITY-OF-LIFE ASSESSMENT QUESTIONS ABOUT: NONE OF THE ABOVE
RATE HOW BOTHERED YOU ARE BY SYMPTOMS OF YOUR SKIN PROBLEM (EG, ITCHING, STINGING BURNING, HURTING OR SKIN IRRITATION): 0 - NEVER BOTHERED

## 2024-08-08 ASSESSMENT — DERMATOLOGY PATIENT ASSESSMENT
ARE YOU AN ORGAN TRANSPLANT RECIPIENT: NO
ARE YOU TRYING TO GET PREGNANT: NO
DO YOU HAVE IRREGULAR MENSTRUAL CYCLES: NO
HAVE YOU HAD OR DO YOU HAVE VASCULAR DISEASE: NO
DO YOU HAVE ANY NEW OR CHANGING LESIONS: NO
DO YOU USE A TANNING BED: NO
ARE YOU ON BIRTH CONTROL: NO

## 2024-08-08 ASSESSMENT — PATIENT GLOBAL ASSESSMENT (PGA): PATIENT GLOBAL ASSESSMENT: PATIENT GLOBAL ASSESSMENT:  1 - CLEAR

## 2024-08-08 ASSESSMENT — ITCH NUMERIC RATING SCALE: HOW SEVERE IS YOUR ITCHING?: 0

## 2024-08-08 NOTE — PROGRESS NOTES
Subjective     Annie Mac is a 63 y.o. female who presents for the following: boils (Pt had it lanced on Monday at [facility]. Pt was in excruciating pain. Pt is diabetic ).     Review of Systems:  No other skin or systemic complaints other than what is documented elsewhere in the note.    The following portions of the chart were reviewed this encounter and updated as appropriate:          Skin Cancer History  No skin cancer on file.      Specialty Problems          Dermatology Problems    Atypical mole    Multiple acquired skin tags    Pincer nail deformity    Pruritus        Objective   Well appearing patient in no apparent distress; mood and affect are within normal limits.    A focused skin examination was performed. All findings within normal limits unless otherwise noted below.    Assessment/Plan   1. Hidradenitis suppurativa'  Mcarthur stage I / early II  Pubic  Sinus track and draining nodule on left suprapubic region    Recommend excision  Flaring  Chronic  Agree with doxycyline for a week or so    Related Medications  clindamycin (Cleocin T) 1 % lotion  Use on pubic area daily    benzoyl peroxide 5 % external wash  Wash groin area daily      Will do excision if ILK for future flare is insufficient  May call if needed

## 2024-08-09 ENCOUNTER — APPOINTMENT (OUTPATIENT)
Dept: PRIMARY CARE | Facility: CLINIC | Age: 64
End: 2024-08-09
Payer: COMMERCIAL

## 2024-08-13 ENCOUNTER — OFFICE VISIT (OUTPATIENT)
Dept: GASTROENTEROLOGY | Facility: HOSPITAL | Age: 64
End: 2024-08-13
Payer: COMMERCIAL

## 2024-08-13 VITALS
OXYGEN SATURATION: 96 % | WEIGHT: 174 LBS | SYSTOLIC BLOOD PRESSURE: 136 MMHG | BODY MASS INDEX: 32.88 KG/M2 | HEART RATE: 70 BPM | TEMPERATURE: 96 F | DIASTOLIC BLOOD PRESSURE: 76 MMHG

## 2024-08-13 DIAGNOSIS — K44.9 HIATAL HERNIA: ICD-10-CM

## 2024-08-13 DIAGNOSIS — K21.9 GASTROESOPHAGEAL REFLUX DISEASE WITHOUT ESOPHAGITIS: ICD-10-CM

## 2024-08-13 DIAGNOSIS — K21.9 GASTROESOPHAGEAL REFLUX DISEASE WITHOUT ESOPHAGITIS: Primary | ICD-10-CM

## 2024-08-13 PROCEDURE — 3050F LDL-C >= 130 MG/DL: CPT | Performed by: NURSE PRACTITIONER

## 2024-08-13 PROCEDURE — 4010F ACE/ARB THERAPY RXD/TAKEN: CPT | Performed by: NURSE PRACTITIONER

## 2024-08-13 PROCEDURE — 99214 OFFICE O/P EST MOD 30 MIN: CPT | Performed by: NURSE PRACTITIONER

## 2024-08-13 PROCEDURE — 3075F SYST BP GE 130 - 139MM HG: CPT | Performed by: NURSE PRACTITIONER

## 2024-08-13 PROCEDURE — 3078F DIAST BP <80 MM HG: CPT | Performed by: NURSE PRACTITIONER

## 2024-08-13 PROCEDURE — 3046F HEMOGLOBIN A1C LEVEL >9.0%: CPT | Performed by: NURSE PRACTITIONER

## 2024-08-13 RX ORDER — FAMOTIDINE 40 MG/1
TABLET, FILM COATED ORAL
Qty: 180 TABLET | Refills: 3 | Status: SHIPPED | OUTPATIENT
Start: 2024-08-13 | End: 2025-08-13

## 2024-08-13 RX ORDER — PANTOPRAZOLE SODIUM 40 MG/1
TABLET, DELAYED RELEASE ORAL
Qty: 90 TABLET | Refills: 3 | Status: SHIPPED | OUTPATIENT
Start: 2024-08-13 | End: 2025-08-13

## 2024-08-13 RX ORDER — PANTOPRAZOLE SODIUM 40 MG/1
40 TABLET, DELAYED RELEASE ORAL DAILY
Qty: 30 TABLET | Refills: 11 | Status: SHIPPED | OUTPATIENT
Start: 2024-08-13 | End: 2024-08-13

## 2024-08-13 RX ORDER — FAMOTIDINE 40 MG/1
40 TABLET, FILM COATED ORAL 2 TIMES DAILY
Qty: 60 TABLET | Refills: 11 | Status: SHIPPED | OUTPATIENT
Start: 2024-08-13 | End: 2024-08-13

## 2024-08-13 ASSESSMENT — LIFESTYLE VARIABLES
SKIP TO QUESTIONS 9-10: 1
HOW MANY STANDARD DRINKS CONTAINING ALCOHOL DO YOU HAVE ON A TYPICAL DAY: PATIENT DOES NOT DRINK
HOW OFTEN DO YOU HAVE A DRINK CONTAINING ALCOHOL: NEVER
AUDIT-C TOTAL SCORE: 0
HOW OFTEN DO YOU HAVE SIX OR MORE DRINKS ON ONE OCCASION: NEVER

## 2024-08-13 ASSESSMENT — PATIENT HEALTH QUESTIONNAIRE - PHQ9
1. LITTLE INTEREST OR PLEASURE IN DOING THINGS: NOT AT ALL
2. FEELING DOWN, DEPRESSED OR HOPELESS: NOT AT ALL
SUM OF ALL RESPONSES TO PHQ9 QUESTIONS 1 & 2: 0

## 2024-08-13 ASSESSMENT — PAIN SCALES - GENERAL: PAINLEVEL: 0-NO PAIN

## 2024-08-13 NOTE — PROGRESS NOTES
Subjective   Patient ID: Annie Mac is a 63 y.o. female.    HPI  63-year-old female for follow-up visit for GERD  Previously seen in the fellows clinic and by Dr. Pereyra 4/18/2023 and requested to switch providers  Medical history includes obesity, hypertension, diabetes type 2, COPD, recurrent vulval Far folliculitis with abscess, CAD, GERD  FHX brother- hep C/liver cancer  2016 EGD showed a normal esophagus, normal stomach, normal duodenum  Normal colonoscopy  6/27/2024 EGD with Bravo showed a 6 cm sliding hiatal hernia and normal stomach, Bravo pH study was negative for pathologic acid however reflux was positive for symptom correlation  Taking famotidine once and not helping  Omeprazole didn't help  Snacks at night sometimes  No dysphagia  BM - good  No blood or melena  Weight- some gain  Some elevation at night     Objective   Physical Exam  Cardiovascular:      Rate and Rhythm: Normal rate and regular rhythm.      Pulses: Normal pulses.      Heart sounds: Normal heart sounds.   Pulmonary:      Effort: Pulmonary effort is normal.      Breath sounds: Normal breath sounds.   Abdominal:      General: Bowel sounds are normal.      Palpations: Abdomen is soft.         Assessment/Plan     GERD with hiatal hernia- I would recommend using the pantoprazole 40 mg once daily as well famotidine twice daily as needed. Try to take the famotidine at bedtime daily to help with the reflux symptoms. Try not to eat with in 3 hrs of bedtime as well as trying to loose 5-10 lbs. I will refer you to see a surgeon for a hiatal hernia repair.    I will see you back for follow up after your surgery

## 2024-08-13 NOTE — PATIENT INSTRUCTIONS
GERD with hiatal hernia- I would recommend using the pantoprazole 40 mg once daily as well famotidine twice daily as needed. Try to take the famotidine at bedtime daily to help with the reflux symptoms. Try not to eat with in 3 hrs of bedtime as well as trying to loose 5-10 lbs. I will refer you to see a surgeon for a hiatal hernia repair.    I will see you back for follow up after your surgery

## 2024-08-15 ENCOUNTER — APPOINTMENT (OUTPATIENT)
Dept: PRIMARY CARE | Facility: CLINIC | Age: 64
End: 2024-08-15
Payer: COMMERCIAL

## 2024-09-13 ENCOUNTER — APPOINTMENT (OUTPATIENT)
Dept: PRIMARY CARE | Facility: CLINIC | Age: 64
End: 2024-09-13
Payer: MEDICAID

## 2024-10-18 NOTE — PROGRESS NOTES
GENERAL SURGERY/TRAUMA  HISTORY AND PHYSICAL/CONSULT    Date: 10/18/2024 Time: 8:23 AM    Name: Annie Mac  MRN: [id number]    This is a 64 y.o. female with a 6cm hiatal hernia and GERD, referred by KAIN- Ann Alonso.    EGD 6/27/24:   Impression  6 cm type I hiatal hernia  The stomach appeared normal.  A pH capsule was placed successfully in the esophagus (26 cm from the incisors, 6 cm from the GE junction).  Findings  Regular Z-line 32 cm from the incisors  6 cm sliding hiatal hernia (type I hiatal hernia) - GE junction 32 cm from the incisors, diaphragmatic impression 38 cm from the incisors. Erythema arounsdZ line and also coughing and reflux during EGD suggestive of siginificant GERD.  The stomach appeared normal.  A pH capsule was placed successfully in the esophagus (26 cm from the incisors, 6 cm from the GE junction).       PAST MEDICAL HISTORY:  Patient Active Problem List   Diagnosis    Abdominal bruit    Abnormal mammogram    Abnormal ultrasound of carotid artery    Bruit of left carotid artery    Cervical radiculopathy    Lumbar radiculopathy, right    ACS (acute coronary syndrome) (Multi)    Age-related nuclear cataract of both eyes    Allergic reaction    Anxiety    Arthritis    Osteoarthritis    Arthritis of lumbar spine    Arthritis of right shoulder region    Asthma    Atypical mole    Avulsion of fingernail of right hand    Bilateral dry eyes    Bursitis of shoulder, right, adhesive    Calcium kidney stones    Carpal tunnel syndrome    Chronic obstructive pulmonary disease (Multi)    Chronic right shoulder pain    Snoring    De Quervain's tenosynovitis, right    Diabetes mellitus (Multi)    Diabetes mellitus type 2 without retinopathy (Multi)    Diffuse goiter    Dizziness    Dyspnea    Dysuria    Painful urination    Urinary incontinence    Elevated alkaline phosphatase level    Fibroid uterus    Blurry vision    Frontal headache    Gallstones    GERD without esophagitis    Headaches,  cluster    HSV (herpes simplex virus) infection    Hypercholesteremia    Hypertension    Insomnia    Irregular menses    Abnormal blood chemistry    Low vitamin D level    Menopausal symptom    MGD (meibomian gland dysfunction)    Migraine headache    Multiple acquired skin tags    NAFLD (nonalcoholic fatty liver disease)    Neck mass    Nicotine dependence, cigarettes, uncomplicated    Obesity (BMI 30.0-34.9)    Pincer nail deformity    Pruritus    Renal cyst    Sacroiliitis (CMS-HCC)    Sensorineural hearing loss (SNHL) of both ears    Subjective pulsatile tinnitus of both ears    Situational phobia    Somatic dysfunction of upper extremity    Foraminal stenosis of lumbosacral region    Stenosis of lateral recess of lumbar spine    Subacromial bursitis of right shoulder joint    Tendonitis of upper biceps tendon of right shoulder    Goiter    Thyroid cyst    Tinnitus of both ears    Upper respiratory infection    Vaginal dryness, menopausal    Vaginal irritation    Vitamin D deficiency    Vulvar abscess    Vulvar cyst      Past Medical History:   Diagnosis Date    Bursitis of right shoulder 2022    Subacromial bursitis of right shoulder joint    Cough, unspecified 2015    Cough    Cyst of kidney, acquired 2018    Renal cyst    Hypertension     Other specified noninflammatory disorders of vagina 2022    Vaginal irritation    Personal history of other diseases of the respiratory system     History of pulmonary emphysema    Personal history of pneumonia (recurrent)     History of pneumonia    Type 2 diabetes mellitus without complications (Multi) 2022    Diabetes mellitus        PAST SURGICAL HISTORY:  Past Surgical History:   Procedure Laterality Date    BREAST SURGERY  2013    Breast Surgery     SECTION, CLASSIC  2014     Section    HYSTERECTOMY  2016    Hysterectomy    OTHER SURGICAL HISTORY  2020    Surgically induced     OTHER SURGICAL  HISTORY  02/17/2014    Laparoscopic Excision Of Ectopic Pregnancy And Salpingectomy    US GUIDED THYROID BIOPSY  10/03/2016    US GUIDED THYROID BIOPSY 10/3/2016 [facility] ANCILLARY LEGACY       FAMILY HISTORY:  Family History   Problem Relation Name Age of Onset    Hypertension Mother      Diabetes type II Mother      Hypertension Father      Diabetes type II Father      Coronary artery disease Brother      Breast cancer Maternal Grandmother      Breast cancer Maternal Cousin          SOCIAL HISTORY:  Social History     Tobacco Use    Smoking status: Every Day     Current packs/day: 0.50     Types: Cigarettes    Smokeless tobacco: Never   Vaping Use    Vaping status: Never Used   Substance Use Topics    Alcohol use: Never    Drug use: Never       MEDICATIONS:  Prior to Admission Medications:    Current Outpatient Medications:     benzoyl peroxide 5 % external wash, Wash groin area daily, Disp: 240 g, Rfl: 3    clindamycin (Cleocin T) 1 % lotion, Apply topically 2 times a day. Apply sparingly and massage topically, Disp: , Rfl:     clindamycin (Cleocin T) 1 % lotion, Use on pubic area daily, Disp: 60 mL, Rfl: 3    famotidine (Pepcid) 40 mg tablet, TAKE 1 TABLET(40 MG) BY MOUTH TWICE DAILY, Disp: 180 tablet, Rfl: 3    insulin glargine-yfgn (Semglee,insulin glargine-yfgn,) 100 unit/mL vial, Inject under the skin once every 24 hours. Take as directed per insulin instructions., Disp: , Rfl:     losartan (Cozaar) 25 mg tablet, Take 2 tablets (50 mg) by mouth once daily., Disp: , Rfl:     pantoprazole (ProtoNix) 40 mg EC tablet, TAKE 1 TABLET(40 MG) BY MOUTH DAILY. DO NOT CRUSH, CHEW, OR SPLIT, Disp: 90 tablet, Rfl: 3    PARoxetine (Paxil) 10 mg tablet, Take 1 tablet (10 mg) by mouth once daily in the morning., Disp: , Rfl:     pen needle, diabetic (BD ULTRA-FINE JAZ PEN NEEDLE MISC), 32G X 4MM; uses 4 times a day, Disp: , Rfl:     valACYclovir (Valtrex) 500 mg tablet, Take 1 tablet (500 mg) by mouth 2 times a day., Disp: ,  "Rfl:     ALLERGIES:  Allergies   Allergen Reactions    Iodinated Contrast Media Anaphylaxis and Rash    Aspirin Unknown    Codeine Unknown    Exenatide Other     Irritation and swelling    Humalog Gautam Kwikpen U-100 [Insulin Lispro] Other     Headache; lightheadedness    Metronidazole Unknown    Sulfamethoxazole-Trimethoprim Unknown       REVIEW OF SYSTEMS:  GENERAL: Negative for malaise, significant weight loss and fever  NECK: Negative for lumps, goiter, pain and significant neck swelling  RESPIRATORY: Negative for cough, wheezing or shortness of breath.  CARDIOVASCULAR: Negative for chest pain, leg swelling or palpitations.  GI: Negative for abdominal discomfort, blood in stools or black stools or change in bowel habits  : No history of dysuria, frequency or incontinence  MUSCULOSKELETAL: Negative for joint pain or swelling, back pain or muscle pain.  SKIN: Negative for lesions, rash, and itching.  PSYCH: Negative for sleep disturbance, mood disorder and recent psychosocial stressors.  ENDOCRINE: Negative for cold or heat intolerance, polyuria, polydipsia and goiter.    PHYSICAL EXAM:  Blood pressure (!) 175/91, pulse 81, height 1.549 m (5' 1\"), weight 77.1 kg (170 lb), SpO2 94%.  General appearance: Central obesity, no other obvious abnormalities   Skin: warm, no erythema or rashes  Lungs: clear to percussion and auscultation  Heart: regular rhythm and S1, S2 normal  Abdomen: Soft, nondistended, nontender  Extremities: Normal exam of the extremities. No swelling or pain.    IMPRESSION:  Annie Mac is a 64 y.o. female with diabetes, GERD, hiatal hernia, current BMI 32    Patient has significant reflux symptoms including heartburn, chest pain regurgitation issues.    She is on PPI and H2 blockers with intermittent symptom relief, pH study was positive for reflux with poor symptom correlation.    She has been diabetic for last many years and is on insulin.    Impression:    Overall I feel like her " symptoms are a combination of hiatal hernia, reflux possible gastroparesis from diabetes and or GLP-1 analog.    Overall even though her BMI is not high with most of her weight is in her belly and central obesity is contributing or aggravating her symptoms of reflux on top of diabetes and hiatal hernia.    I discussed hiatal hernia repair with or without fundoplication with high risk of postoperative dysphagia, gastroparesis hernia recurrence issues.    A better choice in my opinion is gastric bypass with hiatal hernia repair which will help her with diabetes, weight loss, reflux issues.    She was extremely excited about this idea because she wants to lose weight and wants to feel better.    She is currently smoking 2 to 3 cigarettes every day and I informed her that she needs to stop smoking altogether.    Need for dietitian follow up and vitamin supplementation emphasized.     Need to abstain from smoking and NSAID use after gastric bypass emphasized otherwise risk of developing  marginal ulcer and consequences like chronic pain, perforation, bleeding, stricture etc discussed.    Diet exercise and life style modification for successful weight loss emphasized.     Risks, benefits, alternatives of different surgical options were discussed with patient in detail. All questions answered. Risk of bleeding, infection, leak, sepsis, stricture, DVT, PE, armani-op cardiopulmonary complications, internal hernia, adhesions, nutritional deficiency, failure to lose weight, weight regain, need for long term dietary and exercise requirements, lose skin, hair loss, bone loss, , kidney stones, gallstone formation and possible need for interventions related to that, need for long term supplements, death, etc all discussed and patient encouraged to ask more questions if desired. Patient voiced understanding and wants to proceed.    Tom Becerra MD  Bariatric and Minimally Invasive General Surgery

## 2024-10-21 ENCOUNTER — APPOINTMENT (OUTPATIENT)
Dept: PRIMARY CARE | Facility: CLINIC | Age: 64
End: 2024-10-21
Payer: COMMERCIAL

## 2024-10-22 ENCOUNTER — DOCUMENTATION (OUTPATIENT)
Dept: SURGERY | Facility: HOSPITAL | Age: 64
End: 2024-10-22
Payer: MEDICARE

## 2024-10-22 ENCOUNTER — OFFICE VISIT (OUTPATIENT)
Dept: SURGERY | Facility: HOSPITAL | Age: 64
End: 2024-10-22
Payer: MEDICARE

## 2024-10-22 VITALS
HEIGHT: 61 IN | WEIGHT: 170 LBS | OXYGEN SATURATION: 94 % | HEART RATE: 81 BPM | DIASTOLIC BLOOD PRESSURE: 91 MMHG | SYSTOLIC BLOOD PRESSURE: 175 MMHG | BODY MASS INDEX: 32.1 KG/M2

## 2024-10-22 DIAGNOSIS — K21.9 GASTROESOPHAGEAL REFLUX DISEASE WITHOUT ESOPHAGITIS: Primary | ICD-10-CM

## 2024-10-22 DIAGNOSIS — Z13.21 ENCOUNTER FOR VITAMIN DEFICIENCY SCREENING: ICD-10-CM

## 2024-10-22 DIAGNOSIS — K44.9 HIATAL HERNIA: ICD-10-CM

## 2024-10-22 DIAGNOSIS — Z51.81 ENCOUNTER FOR THERAPEUTIC DRUG MONITORING: ICD-10-CM

## 2024-10-22 DIAGNOSIS — E11.69 TYPE 2 DIABETES MELLITUS WITH OTHER SPECIFIED COMPLICATION, UNSPECIFIED WHETHER LONG TERM INSULIN USE (MULTI): ICD-10-CM

## 2024-10-22 DIAGNOSIS — K31.84 GASTROPARESIS: ICD-10-CM

## 2024-10-22 PROCEDURE — 3080F DIAST BP >= 90 MM HG: CPT | Performed by: SURGERY

## 2024-10-22 PROCEDURE — 99205 OFFICE O/P NEW HI 60 MIN: CPT | Performed by: SURGERY

## 2024-10-22 PROCEDURE — 3046F HEMOGLOBIN A1C LEVEL >9.0%: CPT | Performed by: SURGERY

## 2024-10-22 PROCEDURE — 4010F ACE/ARB THERAPY RXD/TAKEN: CPT | Performed by: SURGERY

## 2024-10-22 PROCEDURE — 99215 OFFICE O/P EST HI 40 MIN: CPT | Performed by: SURGERY

## 2024-10-22 PROCEDURE — 3008F BODY MASS INDEX DOCD: CPT | Performed by: SURGERY

## 2024-10-22 PROCEDURE — 3050F LDL-C >= 130 MG/DL: CPT | Performed by: SURGERY

## 2024-10-22 PROCEDURE — 3077F SYST BP >= 140 MM HG: CPT | Performed by: SURGERY

## 2024-10-31 ENCOUNTER — TELEPHONE (OUTPATIENT)
Dept: ENDOCRINOLOGY | Facility: CLINIC | Age: 64
End: 2024-10-31
Payer: MEDICARE

## 2025-01-06 ENCOUNTER — TRANSCRIBE ORDERS (OUTPATIENT)
Dept: ORTHOPEDIC SURGERY | Facility: HOSPITAL | Age: 65
End: 2025-01-06
Payer: COMMERCIAL

## 2025-01-06 DIAGNOSIS — M54.50 LOW BACK PAIN, UNSPECIFIED BACK PAIN LATERALITY, UNSPECIFIED CHRONICITY, UNSPECIFIED WHETHER SCIATICA PRESENT: ICD-10-CM

## 2025-01-07 ENCOUNTER — APPOINTMENT (OUTPATIENT)
Dept: RADIOLOGY | Facility: CLINIC | Age: 65
End: 2025-01-07
Payer: COMMERCIAL

## 2025-01-07 ENCOUNTER — APPOINTMENT (OUTPATIENT)
Dept: ORTHOPEDIC SURGERY | Facility: CLINIC | Age: 65
End: 2025-01-07
Payer: COMMERCIAL

## 2025-01-07 ENCOUNTER — DOCUMENTATION (OUTPATIENT)
Dept: SURGERY | Facility: CLINIC | Age: 65
End: 2025-01-07
Payer: COMMERCIAL

## 2025-01-07 NOTE — PROGRESS NOTES
Last pt interaction was on 10/22/24 in clinic. Pt has no completed any additional clearances and NSH'd her sleep med consult on 12/30/24.     Pt dropped. Letter sent via [employer] [facility].

## 2025-01-13 ENCOUNTER — APPOINTMENT (OUTPATIENT)
Dept: NEUROLOGY | Facility: CLINIC | Age: 65
End: 2025-01-13
Payer: MEDICAID

## 2025-05-07 ENCOUNTER — TELEPHONE (OUTPATIENT)
Dept: OBSTETRICS AND GYNECOLOGY | Facility: CLINIC | Age: 65
End: 2025-05-07
Payer: COMMERCIAL

## 2025-05-07 DIAGNOSIS — B00.9 HERPES: ICD-10-CM

## 2025-05-07 RX ORDER — VALACYCLOVIR HYDROCHLORIDE 500 MG/1
500 TABLET, FILM COATED ORAL 2 TIMES DAILY
Qty: 60 TABLET | Refills: 1 | Status: SHIPPED | OUTPATIENT
Start: 2025-05-07

## 2025-05-07 NOTE — TELEPHONE ENCOUNTER
Patient requesting refill of Valacyclovir. Has appointment scheduled with [facility] on 8/7, which was first available for annual.

## 2025-06-12 ENCOUNTER — APPOINTMENT (OUTPATIENT)
Dept: PAIN MEDICINE | Facility: CLINIC | Age: 65
End: 2025-06-12
Payer: COMMERCIAL

## 2025-08-11 ENCOUNTER — APPOINTMENT (OUTPATIENT)
Dept: GASTROENTEROLOGY | Facility: HOSPITAL | Age: 65
End: 2025-08-11
Payer: COMMERCIAL

## 2025-10-28 ENCOUNTER — APPOINTMENT (OUTPATIENT)
Dept: GASTROENTEROLOGY | Facility: HOSPITAL | Age: 65
End: 2025-10-28
Payer: COMMERCIAL